# Patient Record
Sex: MALE | Race: WHITE | URBAN - METROPOLITAN AREA
[De-identification: names, ages, dates, MRNs, and addresses within clinical notes are randomized per-mention and may not be internally consistent; named-entity substitution may affect disease eponyms.]

---

## 2018-04-03 ENCOUNTER — OFFICE VISIT (OUTPATIENT)
Dept: FAMILY MEDICINE CLINIC | Age: 43
End: 2018-04-03

## 2018-04-03 VITALS
OXYGEN SATURATION: 97 % | TEMPERATURE: 97.9 F | RESPIRATION RATE: 18 BRPM | HEART RATE: 74 BPM | BODY MASS INDEX: 30.46 KG/M2 | WEIGHT: 201 LBS | HEIGHT: 68 IN | SYSTOLIC BLOOD PRESSURE: 158 MMHG | DIASTOLIC BLOOD PRESSURE: 108 MMHG

## 2018-04-03 DIAGNOSIS — M1A.0710 IDIOPATHIC CHRONIC GOUT OF RIGHT FOOT WITHOUT TOPHUS: ICD-10-CM

## 2018-04-03 DIAGNOSIS — I10 ESSENTIAL HYPERTENSION: Primary | ICD-10-CM

## 2018-04-03 PROBLEM — M1A.9XX0 CHRONIC GOUT: Status: ACTIVE | Noted: 2018-04-03

## 2018-04-03 PROBLEM — E66.9 OBESITY (BMI 30-39.9): Status: ACTIVE | Noted: 2018-04-03

## 2018-04-03 RX ORDER — COLCHICINE 0.6 MG/1
TABLET ORAL
Qty: 30 TAB | Refills: 0 | Status: SHIPPED | OUTPATIENT
Start: 2018-04-03 | End: 2019-05-21

## 2018-04-03 RX ORDER — METOPROLOL TARTRATE 50 MG/1
TABLET ORAL 2 TIMES DAILY
COMMUNITY
End: 2018-04-03 | Stop reason: ALTCHOICE

## 2018-04-03 RX ORDER — METOPROLOL SUCCINATE 50 MG/1
50 TABLET, EXTENDED RELEASE ORAL DAILY
Qty: 90 TAB | Refills: 3 | Status: SHIPPED | OUTPATIENT
Start: 2018-04-03 | End: 2019-07-22 | Stop reason: SDUPTHER

## 2018-04-03 RX ORDER — LISINOPRIL 40 MG/1
40 TABLET ORAL DAILY
COMMUNITY
End: 2018-04-03 | Stop reason: SDUPTHER

## 2018-04-03 RX ORDER — HYDROCHLOROTHIAZIDE 25 MG/1
25 TABLET ORAL DAILY
COMMUNITY
End: 2018-04-03 | Stop reason: ALTCHOICE

## 2018-04-03 RX ORDER — LISINOPRIL 40 MG/1
40 TABLET ORAL DAILY
Qty: 90 TAB | Refills: 3 | Status: SHIPPED | OUTPATIENT
Start: 2018-04-03 | End: 2019-04-11 | Stop reason: SDUPTHER

## 2018-04-03 NOTE — PROGRESS NOTES
HPI:   Seema Okeefe is a 38 y.o. male who presents to [facility] care. Gout HPI  Seema Okeefe is an 38 y.o.  male who presents for follow-up of gout. Past gout history: chronic tophaceous gout. Current gout treatment: none. Progress since last visit: gouty attacks are acute. The patient reports onset of an acute gout attack involving the right foot beginning 4 days, and being treated with none. .  The patient is attempting to avoid high purine foods and alcohol. Hypertension  Patient is here for follow-up of hypertension. He indicates that he is feeling well and denies any symptoms referable to his hypertension, including chest pain, palpitations, dyspnea, orthopnea, or peripheral edema. Patient has these side effects of medication: none. He is not exercising and is adherent to low salt diet. Blood pressure is not well controlled at home. Use of agents associated with hypertension: none. History of renal disease: no.  Cardiovascular risk factors: obesity, male gender, hypertension. He is interested in weight loss. Plans to increase aerobic activity once he improves in his foot pain. Current Outpatient Prescriptions   Medication Sig Dispense Refill    lisinopril (PRINIVIL, ZESTRIL) 40 mg tablet Take 1 Tab by mouth daily. 90 Tab 3    metoprolol succinate (TOPROL-XL) 50 mg XL tablet Take 1 Tab by mouth daily. 90 Tab 3    colchicine 0.6 mg tablet Two tablets at the onset of symptoms by mouth, repeat one tablet 1 hour later. Day 2: 1 tablet daily until symptoms. 30 Tab 0      No Known Allergies   Patient Active Problem List   Diagnosis Code    Essential hypertension I10    Obesity (BMI 30-39. 9) E66.9    Chronic gout M1A. 9XX0     Past Medical History:   Diagnosis Date    Eczema     Hypertension       Past Surgical History:   Procedure Laterality Date    HX ADENOIDECTOMY      HX HERNIA REPAIR      HX ORTHOPAEDIC      right shoulder surgery       No LMP for male patient. Family History   Problem Relation Age of Onset    Hypertension Mother    [facility] G[facility] Mother     Elevated Lipids Father     Heart Attack Father 61     CAD    No Known Problems Brother     Heart Attack Paternal Grandfather     Cancer Paternal Grandfather 72     Pancreatic    No Known Problems Son     No Known Problems Daughter     No Known Problems Daughter       Social History     Social History    Marital status: SINGLE     Spouse name: N/A    Number of children: N/A    Years of education: N/A     Occupational History    Not on file. Social History Main Topics    Smoking status: Never Smoker    Smokeless tobacco: Never Used    Alcohol use 1.2 oz/week     2 Shots of liquor per week      Comment: 1 to 2 drinks daily     Drug use: No    Sexual activity: Not Currently     Other Topics Concern    Not on file     Social History Narrative    No narrative on file        ROS:   Review of Systems   Constitutional: Negative for fever, malaise/fatigue and weight loss. HENT: Negative for hearing loss. Eyes: Negative for blurred vision and pain. Respiratory: Negative for cough and shortness of breath. Cardiovascular: Negative for chest pain, palpitations and leg swelling. Gastrointestinal: Negative for abdominal pain, blood in stool, constipation, diarrhea and melena. Genitourinary: Negative for dysuria and hematuria. Musculoskeletal: Positive for joint pain (foot). Skin: Negative for rash. Neurological: Negative for headaches. Psychiatric/Behavioral: Negative for depression. The patient is not nervous/anxious and does not have insomnia. Physical Exam:     Visit Vitals    BP (!) 158/108 (BP 1 Location: Left arm, BP Patient Position: Sitting)    Pulse 74    Temp 97.9 °F (36.6 °C) (Oral)    Resp 18    Ht 5' 8\" (1.727 m)    Wt 201 lb (91.2 kg)    SpO2 97%    BMI 30.56 kg/m2        Vitals and Nurse Documentation reviewed. Physical Exam   Constitutional: No distress. Obese. HENT:   Right Ear: Tympanic membrane is not erythematous and not bulging. No middle ear effusion. Left Ear: Tympanic membrane is not erythematous and not bulging. No middle ear effusion. Nose: No rhinorrhea. Right sinus exhibits no maxillary sinus tenderness and no frontal sinus tenderness. Left sinus exhibits no maxillary sinus tenderness and no frontal sinus tenderness. Mouth/Throat: No oropharyngeal exudate or posterior oropharyngeal erythema. Eyes: EOM and lids are normal.   Cardiovascular: S1 normal and S2 normal.  Exam reveals no gallop and no friction rub. No murmur heard. Pulmonary/Chest: Breath sounds normal. He has no wheezes. Musculoskeletal:        Right foot: There is tenderness. There is normal range of motion and no swelling. Feet:    No erythema     Lymphadenopathy:     He has no cervical adenopathy. Skin: Skin is warm and dry. Psychiatric: Mood and affect normal.         Assessment/ Plan:   [employer] were discussed including hours of operation, on-call providers, and MyChart. Diagnoses and all orders for this visit:    1. Essential hypertension  -     lisinopril (PRINIVIL, ZESTRIL) 40 mg tablet; Take 1 Tab by mouth daily. -     metoprolol succinate (TOPROL-XL) 50 mg XL tablet; Take 1 Tab by mouth daily. Discontinue HCTZ which may be increasing risk for gout flare. Restart Lisinopril as directed. Continue to monitor blood pressures for a goal of 140/90 or less. Encouraged weight loss. 2. Idiopathic chronic gout of right foot without tophus  -     colchicine 0.6 mg tablet; Two tablets at the onset of symptoms by mouth, repeat one tablet 1 hour later. Day 2: 1 tablet daily until symptoms. Treatment as above. Discussed low purine diet and restriction of alcohol. Recheck uric acid at next follow up. If greater than 6, consider addition of daily allopurinol. Discussed expected course/resolution/complications of diagnosis in detail with patient.    Medication risks/benefits/costs/interactions/alternatives discussed with patient.    Pt was given an after visit summary which includes diagnoses, current medications & vitals.    Pt expressed understanding with the diagnosis and plan        Follow-up Disposition:  Return in about 4 weeks (around 5/1/2018) for Complete Physical.

## 2018-04-03 NOTE — PATIENT INSTRUCTIONS
Purine-Restricted Diet: Care Instructions  Your Care Instructions    Purines are substances that are found in some foods. Your body turns purines into uric acid. High levels of uric acid can cause gout, which is a form of arthritis that causes pain and inflammation in joints. You may be able to help control the amount of uric acid in your body by limiting high-purine foods in your diet. Follow-up care is a key part of your treatment and safety. Be sure to make and go to all appointments, and call your doctor if you are having problems. It's also a good idea to know your test results and keep a list of the medicines you take. How can you care for yourself at home? · Plan your meals and snacks around foods that are low in purines and are safe for you to eat. These foods include:  ¨ Green vegetables and tomatoes. ¨ Fruits. ¨ Whole-grain breads, rice, and cereals. ¨ Eggs, peanut butter, and nuts. ¨ Low-fat milk, cheese, and other milk products. ¨ Popcorn. ¨ Gelatin desserts, chocolate, cocoa, and cakes and sweets, in small amounts. · You can eat certain foods that are medium-high in purines, but eat them only once in a while. These foods include:  ¨ Legumes, such as dried beans and dried peas. You can have 1 cup cooked legumes each day. ¨ Asparagus, cauliflower, spinach, mushrooms, and green peas. ¨ Fish and seafood (other than very high-purine seafood). ¨ Oatmeal, wheat bran, and wheat germ. · Limit very high-purine foods, including:  ¨ Organ meats, such as liver, kidneys, sweetbreads, and brains. ¨ Meats, including pinto, beef, pork, and lamb. ¨ Game meats and any other meats in large amounts. ¨ Anchovies, sardines, herring, mackerel, and scallops. ¨ Gravy. ¨ Beer. Where can you learn more? Go to http://jez-jose.info/. Enter F448 in the search box to learn more about \"Purine-Restricted Diet: Care Instructions. \"  Current as of:  May 12, 2017  Content Version: 11.4  © 9720-5952 [employer]wise, [employer]. Care instructions adapted under license by [employer] (which disclaims liability or warranty for this information). If you have questions about a medical condition or this instruction, always ask your healthcare professional. [place] [address] any warranty or liability for your use of this information.

## 2018-04-03 NOTE — MR AVS SNAPSHOT
[address] 
[phone] Patient: Landen MRN: [id number] DSO:[id number] Visit Information Date & Time Provider Department Dept. Phone Encounter #  
 4/3/2018 10:30 AM Corrie Olsen  [facility] [phone] [id number] Follow-up Instructions Return in about 4 weeks (around 5/1/2018) for Complete Physical.  
  
Upcoming Health Maintenance Date Due DTaP/Tdap/Td series (2 - Td) 3/2/2025 Allergies as of 4/3/2018  Review Complete On: 4/3/2018 By: Corrie Olsen NP No Known Allergies Current Immunizations  Never Reviewed No immunizations on file. Not reviewed this visit You Were Diagnosed With   
  
 Codes Comments Essential hypertension    -  Primary ICD-10-CM: I10 
ICD-9-CM: 401.9 Idiopathic chronic gout of right foot without tophus     ICD-10-CM: M1A.0710 ICD-9-CM: 274.02 Vitals BP Pulse Temp Resp Height(growth percentile) Weight(growth percentile) (!) 158/108 (BP 1 Location: Left arm, BP Patient Position: Sitting) 74 97.9 °F (36.6 °C) (Oral) 18 5' 8\" (1.727 m) 201 lb (91.2 kg) SpO2 BMI Smoking Status 97% 30.56 kg/m2 Never Smoker Vitals History BMI and BSA Data Body Mass Index Body Surface Area 30.56 kg/m 2 2.09 m 2 Preferred Pharmacy Pharmacy Name Phone [address] [address] [place] [address] [facility] [phone] Your Updated Medication List  
  
   
This list is accurate as of 4/3/18 11:24 AM.  Always use your most recent med list.  
  
  
  
  
 colchicine 0.6 mg tablet Two tablets at the onset of symptoms by mouth, repeat one tablet 1 hour later. Day 2: 1 tablet daily until symptoms. lisinopril 40 mg tablet Commonly known as:  [place] Take 1 Tab by mouth daily. metoprolol succinate 50 mg XL tablet Commonly known as:  TOPROL-XL Take 1 Tab by mouth daily. Prescriptions Sent to Pharmacy Refills  
 lisinopril (PRINIVIL, ZESTRIL) 40 mg tablet 3 Sig: Take 1 Tab by mouth daily. Class: Normal  
 Pharmacy: [facility] [address] [place] [address], [address] [place] [address] AT [place] [address] Ph #: [phone] Route: Oral  
 metoprolol succinate (TOPROL-XL) 50 mg XL tablet 3 Sig: Take 1 Tab by mouth daily. Class: Normal  
 Pharmacy: [facility] [address] [place] [address], [address] [place] [address] AT [place] [address] Ph #: [phone] Route: Oral  
 colchicine 0.6 mg tablet 0 Sig: Two tablets at the onset of symptoms by mouth, repeat one tablet 1 hour later. Day 2: 1 tablet daily until symptoms. Class: Normal  
 Pharmacy: [facility] [address], [address] [place] [address] AT [place] [address] Ph #: [phone] Follow-up Instructions Return in about 4 weeks (around 5/1/2018) for Complete Physical.  
  
  
Patient Instructions Purine-Restricted Diet: Care Instructions Your Care Instructions Purines are substances that are found in some foods. Your body turns purines into uric acid. High levels of uric acid can cause gout, which is a form of arthritis that causes pain and inflammation in joints. You may be able to help control the amount of uric acid in your body by limiting high-purine foods in your diet. Follow-up care is a key part of your treatment and safety. Be sure to make and go to all appointments, and call your doctor if you are having problems. It's also a good idea to know your test results and keep a list of the medicines you take. How can you care for yourself at home? · Plan your meals and snacks around foods that are low in purines and are safe for you to eat. These foods include: ¨ Green vegetables and tomatoes. ¨ Fruits. ¨ Whole-grain breads, rice, and cereals. ¨ Eggs, peanut butter, and nuts. ¨ Low-fat milk, cheese, and other milk products. ¨ Popcorn. ¨ Gelatin desserts, chocolate, cocoa, and cakes and sweets, in small amounts. · You can eat certain foods that are medium-high in purines, but eat them only once in a while. These foods include: ¨ Legumes, such as dried beans and dried peas. You can have 1 cup cooked legumes each day. ¨ Asparagus, cauliflower, spinach, mushrooms, and green peas. ¨ Fish and seafood (other than very high-purine seafood). ¨ Oatmeal, wheat bran, and wheat germ. · Limit very high-purine foods, including: ¨ Organ meats, such as liver, kidneys, sweetbreads, and brains. ¨ Meats, including pinto, beef, pork, and lamb. ¨ Game meats and any other meats in large amounts. ¨ Anchovies, sardines, herring, mackerel, and scallops. ¨ Gravy. ¨ Beer. Where can you learn more? Go to http://jezExtreme Enterprisesjose.info/. Enter F448 in the search box to learn more about \"Purine-Restricted Diet: Care Instructions. \" Current as of: May 12, 2017 Content Version: 11.4 © 7608-5691 [employer]. Care instructions adapted under license by [employer] (which disclaims liability or warranty for this information). If you have questions about a medical condition or this instruction, always ask your healthcare professional. [place] [address] any warranty or liability for your use of this information. Introducing [facility]! [facility] introduces [employer] patient portal. Now you can access parts of your medical record, email your doctor's office, and request medication refills online. 1. In your internet browser, go to [email or web address] 2. Click on the First Time User? Click Here link in the Sign In box. You will see the New Member Sign Up page. 3. Enter your [employer] Access Code exactly as it appears below.  You will not need to use this code after youve completed the sign-up process. If you do not sign up before the expiration date, you must request a new code. · [employer] Access Code: [email or web address] Expires: 7/2/2018 10:05 AM 
 
4. Enter the last four digits of your Social Security Number (xxxx) and Date of Birth (mm/dd/yyyy) as indicated and click Submit. You will be taken to the next sign-up page. 5. Create a [employer] ID. This will be your [employer] login ID and cannot be changed, so think of one that is secure and easy to remember. 6. Create a [employer] password. You can change your password at any time. 7. Enter your Password Reset Question and Answer. This can be used at a later time if you forget your password. 8. Enter your e-mail address. You will receive e-mail notification when new information is available in [address] 9. Click Sign Up. You can now view and download portions of your medical record. 10. Click the Download Summary menu link to download a portable copy of your medical information. If you have questions, please visit the Frequently Asked Questions section of the [employer] website. Remember, [employer] is NOT to be used for urgent needs. For medical emergencies, dial 911. Now available from your iPhone and Android! Please provide this summary of care documentation to your next provider. Your primary care clinician is listed as Nina Pillai. If you have any questions after today's visit, please call [phone].

## 2018-04-03 NOTE — PROGRESS NOTES
Chief Complaint   Patient presents with    New Patient     Establish Care     Hypertension     medication refill    Gout     medication evaluation      1. Have you been to the ER, urgent care clinic since your last visit? Hospitalized since your last visit? No    2. Have you seen or consulted any other health care providers outside of the [address] [place] [address] since your last visit? Include any pap smears or colon screening.  No

## 2018-04-03 NOTE — LETTER
NOTIFICATION RETURN TO WORK / SCHOOL 
 
4/3/2018 11:25 AM 
 
Mr. Kaylin Chaney [address] [facility] [address] To Whom It May Concern: 
 
Kaylin Chaney is currently under the care of GOLDIE Bone. He will not be able to participate in aerobic activity at this time due to elevation of blood pressure. If there are questions or concerns please have the patient contact our office. Sincerely, Caitlyn Jj NP

## 2019-04-05 ENCOUNTER — TELEPHONE (OUTPATIENT)
Dept: FAMILY MEDICINE CLINIC | Age: 44
End: 2019-04-05

## 2019-04-05 NOTE — TELEPHONE ENCOUNTER
Pharmacy requesting medication refill     Medication   lisinopril (PRINIVIL, ZESTRIL) 40 mg tablet    Medication was submitted to another [facility] on 04/03/2019 for 90 tab and 3 refills    Pharmacy on file verified  (Donya Long)

## 2019-04-11 DIAGNOSIS — I10 ESSENTIAL HYPERTENSION: ICD-10-CM

## 2019-04-11 NOTE — TELEPHONE ENCOUNTER
----- Message from Nancyon Evelin sent at 4/11/2019  3:06 PM EDT -----  Regarding: Ladarius Cano/refill  Contact: [facility] calling for refill of pts \"Lysinopril 40mg\" tablets. Fax was sent today. Requested Prescriptions     Pending Prescriptions Disp Refills    lisinopril (PRINIVIL, ZESTRIL) 40 mg tablet 90 Tab 3     Sig: Take 1 Tab by mouth daily.        Pharmacy contact:   Phone: [phone]

## 2019-04-15 RX ORDER — LISINOPRIL 40 MG/1
40 TABLET ORAL DAILY
Qty: 90 TAB | Refills: 0 | Status: SHIPPED | OUTPATIENT
Start: 2019-04-15 | End: 2019-07-16 | Stop reason: SDUPTHER

## 2019-04-15 NOTE — TELEPHONE ENCOUNTER
Oriana Jennings from [address] is calling requesting a verbal order for the medication      lisinopril (PRINIVIL, ZESTRIL) 40 mg tablet.          Best callback:  [phone]    LOV:  Tuesday, April 03, 2018

## 2019-05-21 ENCOUNTER — OFFICE VISIT (OUTPATIENT)
Dept: FAMILY MEDICINE CLINIC | Age: 44
End: 2019-05-21

## 2019-05-21 VITALS
BODY MASS INDEX: 29.4 KG/M2 | WEIGHT: 194 LBS | SYSTOLIC BLOOD PRESSURE: 124 MMHG | DIASTOLIC BLOOD PRESSURE: 74 MMHG | TEMPERATURE: 98.1 F | HEART RATE: 78 BPM | RESPIRATION RATE: 16 BRPM | OXYGEN SATURATION: 98 % | HEIGHT: 68 IN

## 2019-05-21 DIAGNOSIS — M1A.0710 IDIOPATHIC CHRONIC GOUT OF RIGHT FOOT WITHOUT TOPHUS: ICD-10-CM

## 2019-05-21 DIAGNOSIS — Z13.1 SCREENING FOR DIABETES MELLITUS: ICD-10-CM

## 2019-05-21 DIAGNOSIS — Z12.5 ENCOUNTER FOR PROSTATE CANCER SCREENING: ICD-10-CM

## 2019-05-21 DIAGNOSIS — R18.8 OTHER ASCITES: Primary | ICD-10-CM

## 2019-05-21 DIAGNOSIS — I10 ESSENTIAL HYPERTENSION: ICD-10-CM

## 2019-05-21 RX ORDER — SPIRONOLACTONE 100 MG/1
TABLET, FILM COATED ORAL
Refills: 4 | COMMUNITY
Start: 2019-04-27

## 2019-05-21 NOTE — PATIENT INSTRUCTIONS
DASH Diet: Care Instructions Your Care Instructions The DASH diet is an eating plan that can help lower your blood pressure. DASH stands for Dietary Approaches to Stop Hypertension. Hypertension is high blood pressure. The DASH diet focuses on eating foods that are high in calcium, potassium, and magnesium. These nutrients can lower blood pressure. The foods that are highest in these nutrients are fruits, vegetables, low-fat dairy products, nuts, seeds, and legumes. But taking calcium, potassium, and magnesium supplements instead of eating foods that are high in those nutrients does not have the same effect. The DASH diet also includes whole grains, fish, and poultry. The DASH diet is one of several lifestyle changes your doctor may recommend to lower your high blood pressure. Your doctor may also want you to decrease the amount of sodium in your diet. Lowering sodium while following the DASH diet can lower blood pressure even further than just the DASH diet alone. Follow-up care is a key part of your treatment and safety. Be sure to make and go to all appointments, and call your doctor if you are having problems. It's also a good idea to know your test results and keep a list of the medicines you take. How can you care for yourself at home? Following the DASH diet · Eat 4 to 5 servings of fruit each day. A serving is 1 medium-sized piece of fruit, ½ cup chopped or canned fruit, 1/4 cup dried fruit, or 4 ounces (½ cup) of fruit juice. Choose fruit more often than fruit juice. · Eat 4 to 5 servings of vegetables each day. A serving is 1 cup of lettuce or raw leafy vegetables, ½ cup of chopped or cooked vegetables, or 4 ounces (½ cup) of vegetable juice. Choose vegetables more often than vegetable juice. · Get 2 to 3 servings of low-fat and fat-free dairy each day. A serving is 8 ounces of milk, 1 cup of yogurt, or 1 ½ ounces of cheese. · Eat 6 to 8 servings of grains each day. A serving is 1 slice of bread, 1 ounce of dry cereal, or ½ cup of cooked rice, pasta, or cooked cereal. Try to choose whole-grain products as much as possible. · Limit lean meat, poultry, and fish to 2 servings each day. A serving is 3 ounces, about the size of a deck of cards. · Eat 4 to 5 servings of nuts, seeds, and legumes (cooked dried beans, lentils, and split peas) each week. A serving is 1/3 cup of nuts, 2 tablespoons of seeds, or ½ cup of cooked beans or peas. · Limit fats and oils to 2 to 3 servings each day. A serving is 1 teaspoon of vegetable oil or 2 tablespoons of salad dressing. · Limit sweets and added sugars to 5 servings or less a week. A serving is 1 tablespoon jelly or jam, ½ cup sorbet, or 1 cup of lemonade. · Eat less than 2,300 milligrams (mg) of sodium a day. If you limit your sodium to 1,500 mg a day, you can lower your blood pressure even more. Tips for success · Start small. Do not try to make dramatic changes to your diet all at once. You might feel that you are missing out on your favorite foods and then be more likely to not follow the plan. Make small changes, and stick with them. Once those changes become habit, add a few more changes. · Try some of the following: ? Make it a goal to eat a fruit or vegetable at every meal and at snacks. This will make it easy to get the recommended amount of fruits and vegetables each day. ? Try yogurt topped with fruit and nuts for a snack or healthy dessert. ? Add lettuce, tomato, cucumber, and onion to sandwiches. ? Combine a ready-made pizza crust with low-fat mozzarella cheese and lots of vegetable toppings. Try using tomatoes, squash, spinach, broccoli, carrots, cauliflower, and onions. ? Have a variety of cut-up vegetables with a low-fat dip as an appetizer instead of chips and dip. ? Sprinkle sunflower seeds or chopped almonds over salads.  Or try adding chopped walnuts or almonds to cooked vegetables. ? Try some vegetarian meals using beans and peas. Add garbanzo or kidney beans to salads. Make burritos and tacos with mashed kimble beans or black beans. Where can you learn more? Go to http://jez-jose.info/. Enter U513 in the search box to learn more about \"DASH Diet: Care Instructions. \" Current as of: July 22, 2018 Content Version: 11.9 © 5955-0991 [employer]. Care instructions adapted under license by [employer] (which disclaims liability or warranty for this information). If you have questions about a medical condition or this instruction, always ask your healthcare professional. [address] any warranty or liability for your use of this information.

## 2019-05-21 NOTE — PROGRESS NOTES
Assessment/Plan:     Diagnoses and all orders for this visit:    1. Other ascites  -     METABOLIC PANEL, [address] records requested today. Resolved with paracentesis and cessation of alcohol. He will continue avoiding alcohol at this time. 2. Encounter for prostate cancer screening  -     PSA W/ REFLX FREE PSA    3. Screening for diabetes mellitus  -     HEMOGLOBIN A1C WITH EAG    4. Idiopathic chronic gout of right foot without tophus  -     URIC ACID  Not currently on treatment. Discussed dietary improvements. Uric acid pending. 5. Essential hypertension  -     METABOLIC PANEL, COMPREHENSIVE  Stable. Continue current treatment. I have reviewed/discussed the above normal BMI with the patient. I have recommended the following interventions: dietary management education, guidance, and counseling, encourage exercise, monitor weight and prescribed dietary intake. Given written instructions as well. Follow-up and Dispositions    · Return in about 3 months (around 8/21/2019) for Follow Up. Discussed expected course/resolution/complications of diagnosis in detail with patient.    Medication risks/benefits/costs/interactions/alternatives discussed with patient.    Pt was given after visit summary which includes diagnoses, current medications & vitals. Pt expressed understanding with the diagnosis and plan          Subjective:      Vale Eubanks is a 37 y.o. male who presents for had concerns including Hypertension; Gout; LOW BACK PAIN; and Knee Pain. Hospital Follow Up  Vale Eubanks is seen for follow up from recent admission to - on 2/26/2019. We requested the lab results, imaging, the notes, the records. He presented with abdominal swelling requiring paracentesis. He is taking his routine medications as directed & without any side effects. He reports symptoms are improved. Medication Reconciliation reviewed today. Portal vein stenosis was found.     He was previously using 4 alcoholic drinks daily. He has been in full cessation since his hospitalization. He is currently followed by Dr. Álvaro Blackburn. He is not scheduled for follow up. Previous EGD was unremarkable. He is not currently exercising. He is not currently following a specific diet plan. He is attempting to make dietary changes. 24 Hour Diet Recall:  B-biscuits and gravy  L-hamburger and french fries  D-skip  Fluids-sparkling water (3)    He denies a gout flare since his last evaluation. Hypertension  Patient is here for follow-up of hypertension. He indicates that he is feeling well and denies any symptoms referable to his hypertension, including chest pain, palpitations, dyspnea, orthopnea, or peripheral edema. Patient has these side effects of medication: None. [place] He is not exercising and is not adherent to low salt diet. Blood pressure is well controlled at home. Use of agents associated with hypertension: none. History of renal disease: no.  Cardiovascular risk factors: obesity, sedentary life style, male gender, hypertension. Current Outpatient Medications   Medication Sig Dispense Refill    spironolactone (ALDACTONE) 100 mg tablet TK 1 T PO D  4    lisinopril (PRINIVIL, ZESTRIL) 40 mg tablet Take 1 Tab by mouth daily. 90 Tab 0    metoprolol succinate (TOPROL-XL) 50 mg XL tablet Take 1 Tab by mouth daily. 90 Tab 3       No Known Allergies    ROS:   Review of Systems   Constitutional: Negative for malaise/fatigue. Eyes: Negative for blurred vision. Respiratory: Negative for shortness of breath. Cardiovascular: Negative for chest pain. Objective:     Visit Vitals  /74   Pulse 78   Temp 98.1 °F (36.7 °C) (Oral)   Resp 16   Ht 5' 8\" (1.727 m)   Wt 194 lb (88 kg)   SpO2 98%   BMI 29.50 kg/m²       Vitals and Nurse Documentation reviewed. Physical Exam   Constitutional: No distress. Obese.    HENT:   Right Ear: Tympanic membrane is not erythematous and not bulging. No middle ear effusion. Left Ear: Tympanic membrane is not erythematous and not bulging. No middle ear effusion. Nose: No rhinorrhea. Right sinus exhibits no maxillary sinus tenderness and no frontal sinus tenderness. Left sinus exhibits no maxillary sinus tenderness and no frontal sinus tenderness. Mouth/Throat: No oropharyngeal exudate or posterior oropharyngeal erythema. Eyes: EOM and lids are normal.   Cardiovascular: S1 normal and S2 normal. Exam reveals no gallop and no friction rub. No murmur heard. Pulmonary/Chest: Breath sounds normal. He has no wheezes. Abdominal: Soft. Bowel sounds are normal. He exhibits no distension, no fluid wave, no ascites and no mass. There is no hepatosplenomegaly. There is no tenderness. There is no rebound and no guarding. Lymphadenopathy:     He has no cervical adenopathy. Skin: Skin is warm and dry.    Psychiatric: Mood and affect normal.

## 2019-05-21 NOTE — PROGRESS NOTES
Chief Complaint   Patient presents with    Hypertension    Gout     1. Have you been to the ER, urgent care clinic since your last visit? Hospitalized since your last visit? No    2. Have you seen or consulted any other health care providers outside of the [address] [place] [address] since your last visit? Include any pap smears or colon screening.  No

## 2019-05-22 LAB
ALBUMIN SERPL-MCNC: 4.3 G/DL (ref 3.5–5.5)
ALBUMIN/GLOB SERPL: 1.3 {RATIO} (ref 1.2–2.2)
ALP SERPL-CCNC: 70 IU/L (ref 39–117)
ALT SERPL-CCNC: 22 IU/L (ref 0–44)
AST SERPL-CCNC: 29 IU/L (ref 0–40)
BILIRUB SERPL-MCNC: 1.1 MG/DL (ref 0–1.2)
BUN SERPL-MCNC: 12 MG/DL (ref 6–24)
BUN/CREAT SERPL: 14 (ref 9–20)
CALCIUM SERPL-MCNC: 10.1 MG/DL (ref 8.7–10.2)
CHLORIDE SERPL-SCNC: 103 MMOL/L (ref 96–106)
CO2 SERPL-SCNC: 21 MMOL/L (ref 20–29)
CREAT SERPL-MCNC: 0.85 MG/DL (ref 0.76–1.27)
EST. AVERAGE GLUCOSE BLD GHB EST-MCNC: 111 MG/DL
GLOBULIN SER CALC-MCNC: 3.4 G/DL (ref 1.5–4.5)
GLUCOSE SERPL-MCNC: 91 MG/DL (ref 65–99)
HBA1C MFR BLD: 5.5 % (ref 4.8–5.6)
POTASSIUM SERPL-SCNC: 4.2 MMOL/L (ref 3.5–5.2)
PROT SERPL-MCNC: 7.7 G/DL (ref 6–8.5)
PSA SERPL-MCNC: 0.2 NG/ML (ref 0–4)
REFLEX CRITERIA: NORMAL
SODIUM SERPL-SCNC: 138 MMOL/L (ref 134–144)
URATE SERPL-MCNC: 7.2 MG/DL (ref 3.7–8.6)

## 2019-07-16 DIAGNOSIS — I10 ESSENTIAL HYPERTENSION: ICD-10-CM

## 2019-07-16 NOTE — TELEPHONE ENCOUNTER
Pharmacy requesting medication refill     Requested Prescriptions     Pending Prescriptions Disp Refills    lisinopril (PRINIVIL, ZESTRIL) 40 mg tablet 90 Tab 0     Sig: Take 1 Tab by mouth daily.      Pharmacy on file verified  [phone])

## 2019-07-19 RX ORDER — LISINOPRIL 40 MG/1
40 TABLET ORAL DAILY
Qty: 90 TAB | Refills: 0 | Status: SHIPPED | OUTPATIENT
Start: 2019-07-19 | End: 2019-10-17 | Stop reason: SDUPTHER

## 2019-07-22 DIAGNOSIS — I10 ESSENTIAL HYPERTENSION: ICD-10-CM

## 2019-07-22 NOTE — TELEPHONE ENCOUNTER
Pharmacy requesting medication refill   Requested Prescriptions     Pending Prescriptions Disp Refills    metoprolol succinate (TOPROL-XL) 50 mg XL tablet 90 Tab 3     Sig: Take 1 Tab by mouth daily.      Pharmacy on file verified  [phone])

## 2019-07-23 RX ORDER — METOPROLOL SUCCINATE 50 MG/1
50 TABLET, EXTENDED RELEASE ORAL DAILY
Qty: 90 TAB | Refills: 3 | Status: SHIPPED | OUTPATIENT
Start: 2019-07-23 | End: 2020-07-11

## 2019-10-17 DIAGNOSIS — I10 ESSENTIAL HYPERTENSION: ICD-10-CM

## 2019-10-17 NOTE — TELEPHONE ENCOUNTER
Pharmacy is requesting medication refill   Requested Prescriptions     Pending Prescriptions Disp Refills    lisinopril (PRINIVIL, ZESTRIL) 40 mg tablet 90 Tab 0     Sig: Take 1 Tab by mouth daily.        Pharmacy on file verified  [phone])

## 2019-10-18 RX ORDER — LISINOPRIL 40 MG/1
40 TABLET ORAL DAILY
Qty: 90 TAB | Refills: 0 | Status: SHIPPED | OUTPATIENT
Start: 2019-10-18 | End: 2020-01-28

## 2022-03-18 PROBLEM — E66.9 OBESITY (BMI 30-39.9): Status: ACTIVE | Noted: 2018-04-03

## 2022-03-19 PROBLEM — I10 ESSENTIAL HYPERTENSION: Status: ACTIVE | Noted: 2018-04-03

## 2022-03-19 PROBLEM — M1A.9XX0 CHRONIC GOUT: Status: ACTIVE | Noted: 2018-04-03

## 2023-05-19 RX ORDER — SPIRONOLACTONE 100 MG/1
1 TABLET, FILM COATED ORAL DAILY
COMMUNITY
Start: 2019-04-27

## 2023-05-19 RX ORDER — METOPROLOL SUCCINATE 50 MG/1
1 TABLET, EXTENDED RELEASE ORAL DAILY
COMMUNITY
Start: 2020-07-11

## 2023-05-19 RX ORDER — LISINOPRIL 40 MG/1
1 TABLET ORAL DAILY
COMMUNITY
Start: 2020-01-28